# Patient Record
Sex: FEMALE | Race: WHITE | NOT HISPANIC OR LATINO | Employment: OTHER | URBAN - METROPOLITAN AREA
[De-identification: names, ages, dates, MRNs, and addresses within clinical notes are randomized per-mention and may not be internally consistent; named-entity substitution may affect disease eponyms.]

---

## 2024-02-06 ENCOUNTER — OFFICE VISIT (OUTPATIENT)
Dept: URGENT CARE | Facility: CLINIC | Age: 84
End: 2024-02-06
Payer: COMMERCIAL

## 2024-02-06 VITALS
DIASTOLIC BLOOD PRESSURE: 68 MMHG | WEIGHT: 127 LBS | SYSTOLIC BLOOD PRESSURE: 125 MMHG | OXYGEN SATURATION: 97 % | RESPIRATION RATE: 18 BRPM | HEIGHT: 65 IN | HEART RATE: 73 BPM | TEMPERATURE: 99 F | BODY MASS INDEX: 21.16 KG/M2

## 2024-02-06 DIAGNOSIS — U07.1 COVID-19 VIRUS DETECTED: ICD-10-CM

## 2024-02-06 DIAGNOSIS — R51.9 GENERALIZED HEADACHES: Primary | ICD-10-CM

## 2024-02-06 DIAGNOSIS — U07.1 COVID-19: ICD-10-CM

## 2024-02-06 LAB
CTP QC/QA: YES
SARS-COV-2 RDRP RESP QL NAA+PROBE: POSITIVE

## 2024-02-06 PROCEDURE — 99203 OFFICE O/P NEW LOW 30 MIN: CPT | Mod: ,,, | Performed by: NURSE PRACTITIONER

## 2024-02-06 PROCEDURE — 87635 SARS-COV-2 COVID-19 AMP PRB: CPT | Mod: QW,,, | Performed by: NURSE PRACTITIONER

## 2024-02-06 RX ORDER — DICLOFENAC SODIUM 10 MG/G
4 GEL TOPICAL
COMMUNITY
Start: 2023-04-13

## 2024-02-06 RX ORDER — LANOLIN ALCOHOL/MO/W.PET/CERES
1 CREAM (GRAM) TOPICAL EVERY MORNING
COMMUNITY

## 2024-02-06 RX ORDER — ZOLPIDEM TARTRATE 10 MG/1
1 TABLET ORAL NIGHTLY PRN
COMMUNITY
Start: 2024-02-05

## 2024-02-06 RX ORDER — MINERAL OIL
180 ENEMA (ML) RECTAL
COMMUNITY
Start: 2024-02-05 | End: 2024-02-12

## 2024-02-06 RX ORDER — IBUPROFEN 100 MG/5ML
1 SUSPENSION, ORAL (FINAL DOSE FORM) ORAL EVERY MORNING
COMMUNITY

## 2024-02-06 RX ORDER — FUROSEMIDE 20 MG/1
20 TABLET ORAL
COMMUNITY
Start: 2024-01-25

## 2024-02-06 RX ORDER — FLUTICASONE PROPIONATE 50 MCG
1 SPRAY, SUSPENSION (ML) NASAL
COMMUNITY
Start: 2024-02-05 | End: 2024-02-12

## 2024-02-06 RX ORDER — ASPIRIN 81 MG/1
1 TABLET ORAL EVERY MORNING
COMMUNITY

## 2024-02-06 RX ORDER — IBUPROFEN 200 MG
TABLET ORAL EVERY 6 HOURS PRN
COMMUNITY

## 2024-02-06 RX ORDER — RALOXIFENE HYDROCHLORIDE 60 MG/1
1 TABLET, FILM COATED ORAL DAILY
COMMUNITY
Start: 2024-02-05

## 2024-02-06 RX ORDER — PREDNISONE 20 MG/1
20 TABLET ORAL
COMMUNITY
Start: 2024-02-05 | End: 2024-02-10

## 2024-02-06 RX ORDER — MULTIVITAMIN
1 TABLET ORAL EVERY MORNING
COMMUNITY

## 2024-02-07 NOTE — PATIENT INSTRUCTIONS
Take Mucinex as directed for cough.  Nasal saline, Claritin OTC for congestion as directed  Take prescription Paxlovid as directed for COVID-19 per your request    Increase oral fluids, take daily vitamins as directed, Self quarantine for 5 days.    Go to ER for worsening symptoms including shortness of breath, fever, or worsening symptoms.

## 2024-02-07 NOTE — PROGRESS NOTES
"Subjective:      Patient ID: Rosa Kay is a 83 y.o. female.    Vitals:  height is 5' 5" (1.651 m) and weight is 57.6 kg (127 lb). Her temperature is 98.5 °F (36.9 °C). Her blood pressure is 125/68 and her pulse is 73. Her respiration is 18 and oxygen saturation is 97%.     Chief Complaint: Sinus Problem ( Patient is a 83 y.o. female who presents to urgent care with complaints of post nasal drip, headaches x4 days. )    83-year-old female presents with nasal congestion, pressure, without shortness of breath or reports of fever.  Onset 2 days ago.    Sinus Problem  Associated symptoms include congestion and sinus pressure.       HENT:  Positive for congestion, sinus pain and sinus pressure.       Objective:     Physical Exam   Constitutional: She is oriented to person, place, and time. She appears well-developed. She is cooperative.  Non-toxic appearance. She does not appear ill. No distress.   HENT:   Head: Normocephalic and atraumatic.   Ears:   Right Ear: Hearing, tympanic membrane, external ear and ear canal normal.   Left Ear: Hearing, tympanic membrane, external ear and ear canal normal.   Nose: Nose normal. No mucosal edema, rhinorrhea or nasal deformity. No epistaxis. Right sinus exhibits no maxillary sinus tenderness and no frontal sinus tenderness. Left sinus exhibits no maxillary sinus tenderness and no frontal sinus tenderness.   Mouth/Throat: Uvula is midline, oropharynx is clear and moist and mucous membranes are normal. No trismus in the jaw. Normal dentition. No uvula swelling. No oropharyngeal exudate, posterior oropharyngeal edema or posterior oropharyngeal erythema.   Eyes: Conjunctivae and lids are normal. No scleral icterus.   Neck: Trachea normal and phonation normal. Neck supple. No edema present. No erythema present. No neck rigidity present.   Cardiovascular: Normal rate, regular rhythm, normal heart sounds and normal pulses.   Pulmonary/Chest: Effort normal and breath sounds normal. No " respiratory distress. She has no decreased breath sounds. She has no rhonchi.   Abdominal: Normal appearance.   Musculoskeletal: Normal range of motion.         General: No deformity. Normal range of motion.   Neurological: She is alert and oriented to person, place, and time. She exhibits normal muscle tone. Coordination normal.   Skin: Skin is warm, dry, intact, not diaphoretic and not pale.   Psychiatric: Her speech is normal and behavior is normal. Judgment and thought content normal.   Nursing note and vitals reviewed.      Assessment:     1. Generalized headaches    2. COVID-19      Office Visit on 02/06/2024   Component Date Value Ref Range Status    POC Rapid COVID 02/06/2024 Positive (A)  Negative Final     Acceptable 02/06/2024 Yes   Final       Plan:   Take Mucinex as directed for cough.  Nasal saline, Claritin OTC for congestion as directed  Take prescription Paxlovid as directed for COVID-19 per your request    Increase oral fluids, take daily vitamins as directed, Self quarantine for 5 days.    Go to ER for worsening symptoms including shortness of breath, fever, or worsening symptoms.       Generalized headaches  -     POCT COVID-19 Rapid Screening    COVID-19  -     nirmatrelvir-ritonavir 300 mg (150 mg x 2)-100 mg copackaged tablets (EUA); Take 3 tablets by mouth 2 (two) times daily for 5 days. Each dose contains 2 nirmatrelvir (pink tablets) and 1 ritonavir (white tablet). Take all 3 tablets together  Dispense: 30 tablet; Refill: 0

## 2024-02-12 ENCOUNTER — OFFICE VISIT (OUTPATIENT)
Dept: URGENT CARE | Facility: CLINIC | Age: 84
End: 2024-02-12
Payer: COMMERCIAL

## 2024-02-12 VITALS
HEART RATE: 69 BPM | RESPIRATION RATE: 20 BRPM | HEIGHT: 65 IN | OXYGEN SATURATION: 98 % | WEIGHT: 127 LBS | DIASTOLIC BLOOD PRESSURE: 63 MMHG | TEMPERATURE: 98 F | BODY MASS INDEX: 21.16 KG/M2 | SYSTOLIC BLOOD PRESSURE: 114 MMHG

## 2024-02-12 DIAGNOSIS — Z11.52 ENCOUNTER FOR SCREENING LABORATORY TESTING FOR COVID-19 VIRUS IN ASYMPTOMATIC PATIENT: Primary | ICD-10-CM

## 2024-02-12 DIAGNOSIS — Z01.812 ENCOUNTER FOR SCREENING LABORATORY TESTING FOR COVID-19 VIRUS IN ASYMPTOMATIC PATIENT: Primary | ICD-10-CM

## 2024-02-12 LAB
CTP QC/QA: YES
SARS-COV-2 RDRP RESP QL NAA+PROBE: NEGATIVE

## 2024-02-12 PROCEDURE — 87635 SARS-COV-2 COVID-19 AMP PRB: CPT | Mod: QW,,, | Performed by: NURSE PRACTITIONER

## 2024-02-12 PROCEDURE — 99212 OFFICE O/P EST SF 10 MIN: CPT | Mod: ,,, | Performed by: NURSE PRACTITIONER

## 2024-02-12 NOTE — PROGRESS NOTES
"Subjective:      Patient ID: Rosa Kay is a 83 y.o. female.    Vitals:  height is 5' 5" (1.651 m) and weight is 57.6 kg (127 lb). Her temperature is 97.5 °F (36.4 °C). Her blood pressure is 114/63 and her pulse is 69. Her respiration is 20 and oxygen saturation is 98%.     Chief Complaint: COVID RETEST ( Patient is a 83 y.o. female who presents to urgent care for a COVID retest after testing positive a few days ago. Needs to go back to work. )    83-year-old female presents for COVID-19 retest.  Asymptomatic.  Request for return to work  ROS   Objective:     Physical Exam   Neurological: She is alert.   Nursing note and vitals reviewed.    Assessment:     1. Encounter for screening laboratory testing for COVID-19 virus in asymptomatic patient      Office Visit on 02/12/2024   Component Date Value Ref Range Status    POC Rapid COVID 02/12/2024 Negative  Negative Final     Acceptable 02/12/2024 Yes   Final       Plan:   COVID-19 lab test results negative  Return here or follow-up with your PCP for concerns    Encounter for screening laboratory testing for COVID-19 virus in asymptomatic patient  -     POCT COVID-19 Rapid Screening                      "